# Patient Record
Sex: MALE | Race: AMERICAN INDIAN OR ALASKA NATIVE | NOT HISPANIC OR LATINO | Employment: STUDENT | URBAN - METROPOLITAN AREA
[De-identification: names, ages, dates, MRNs, and addresses within clinical notes are randomized per-mention and may not be internally consistent; named-entity substitution may affect disease eponyms.]

---
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
## 2025-01-02 ENCOUNTER — HOSPITAL ENCOUNTER (EMERGENCY)
Facility: HOSPITAL | Age: 20
Discharge: HOME | End: 2025-01-02
Attending: EMERGENCY MEDICINE

## 2025-01-02 VITALS
HEART RATE: 70 BPM | OXYGEN SATURATION: 100 % | DIASTOLIC BLOOD PRESSURE: 80 MMHG | RESPIRATION RATE: 18 BRPM | SYSTOLIC BLOOD PRESSURE: 120 MMHG | TEMPERATURE: 99 F | BODY MASS INDEX: 37.79 KG/M2 | WEIGHT: 279 LBS | HEIGHT: 72 IN

## 2025-01-02 DIAGNOSIS — R68.89 FLU-LIKE SYMPTOMS: ICD-10-CM

## 2025-01-02 DIAGNOSIS — J06.9 VIRAL URI WITH COUGH: Primary | ICD-10-CM

## 2025-01-02 LAB — POC RAPID STREP A: NEGATIVE

## 2025-01-02 PROCEDURE — 87880 STREP A ASSAY W/OPTIC: CPT | Mod: ER

## 2025-01-02 PROCEDURE — 99284 EMERGENCY DEPT VISIT MOD MDM: CPT | Mod: ER

## 2025-01-02 PROCEDURE — 25000003 PHARM REV CODE 250: Mod: ER | Performed by: PHYSICIAN ASSISTANT

## 2025-01-02 RX ORDER — GUAIFENESIN 100 MG/5ML
400 SOLUTION ORAL EVERY 4 HOURS PRN
Qty: 180 ML | Refills: 0 | Status: SHIPPED | OUTPATIENT
Start: 2025-01-02 | End: 2025-01-12

## 2025-01-02 RX ORDER — BENZONATATE 100 MG/1
100 CAPSULE ORAL 3 TIMES DAILY PRN
Qty: 20 CAPSULE | Refills: 0 | Status: SHIPPED | OUTPATIENT
Start: 2025-01-02 | End: 2025-01-09

## 2025-01-02 RX ORDER — ACETAMINOPHEN 500 MG
500 TABLET ORAL EVERY 4 HOURS PRN
Qty: 20 TABLET | Refills: 0 | Status: SHIPPED | OUTPATIENT
Start: 2025-01-02 | End: 2025-01-07

## 2025-01-02 RX ORDER — CETIRIZINE HYDROCHLORIDE 10 MG/1
10 TABLET ORAL DAILY
Qty: 14 TABLET | Refills: 0 | Status: SHIPPED | OUTPATIENT
Start: 2025-01-02 | End: 2025-01-16

## 2025-01-02 RX ORDER — ACETAMINOPHEN 500 MG
500 TABLET ORAL
Status: COMPLETED | OUTPATIENT
Start: 2025-01-02 | End: 2025-01-02

## 2025-01-02 RX ORDER — BENZONATATE 100 MG/1
100 CAPSULE ORAL
Status: COMPLETED | OUTPATIENT
Start: 2025-01-02 | End: 2025-01-02

## 2025-01-02 RX ORDER — ONDANSETRON 4 MG/1
4 TABLET, ORALLY DISINTEGRATING ORAL EVERY 6 HOURS PRN
Qty: 15 TABLET | Refills: 0 | Status: SHIPPED | OUTPATIENT
Start: 2025-01-02 | End: 2025-01-07

## 2025-01-02 RX ORDER — FLUTICASONE PROPIONATE 50 MCG
1 SPRAY, SUSPENSION (ML) NASAL 2 TIMES DAILY PRN
Qty: 15 G | Refills: 0 | Status: SHIPPED | OUTPATIENT
Start: 2025-01-02 | End: 2025-02-01

## 2025-01-02 RX ORDER — IBUPROFEN 600 MG/1
600 TABLET ORAL EVERY 6 HOURS PRN
Qty: 20 TABLET | Refills: 0 | Status: SHIPPED | OUTPATIENT
Start: 2025-01-02 | End: 2025-01-07

## 2025-01-02 RX ADMIN — ACETAMINOPHEN 500 MG: 500 TABLET, FILM COATED ORAL at 03:01

## 2025-01-02 RX ADMIN — BENZONATATE 100 MG: 100 CAPSULE ORAL at 03:01

## 2025-01-02 NOTE — ED NOTES
Fever and cough x 2 days. Sore throat while sitting in waiting room.     Pt is alert, age appropriate and in no acute distress. Respirations are even and unlabored. Bilateral breath sounds are clear throughout chest. abd is soft, not tender and not distended. pt denies change in feeding, bowel or bladder habits. Skin is warm and color is appropriate for ethnicity.  No edema noted to bilateral lower extremities. Pt moves all extremities well. Conjunctivae normal. Pt is dressed appropriately and well groomed.

## 2025-01-02 NOTE — Clinical Note
"Ayden Torres" Enmanuel was seen and treated in our emergency department on 1/2/2025.  He may return to work on 01/08/2025.       If you have any questions or concerns, please don't hesitate to call.      Laura Diaz PA-C"

## 2025-01-02 NOTE — DISCHARGE INSTRUCTIONS

## 2025-01-02 NOTE — ED PROVIDER NOTES
Encounter Date: 1/2/2025    SCRIBE #1 NOTE: I, Eddie Bell, am scribing for, and in the presence of,  Laura Diaz PA-C. I have scribed the following portions of the note - Other sections scribed: hpi,ros,pe.       History     Chief Complaint   Patient presents with    URI     Fever, abdominal pain and cough starting yesterday      CC: URI symptoms    HPI: 20 yo M, with no pertinent PMHx, presents to ED with complaint of URI symptoms onset yesterday. Patient reports productive cough, nasal congestion, sore throat, rhinorrhea, fever, headache, generalized myalgias, decreased appetite, and one episode of vomiting.  Reports TMAX of 100.9 yesterday but reports in triage TMAX was 98.6. Attempted treatment for symptoms with tylenol and Theraflu without relief. Denies any known sick contact. Denies any medical problems. No other aggravating/alleviating factors. Denies diarrhea or other associated symptoms. NKDA.    The history is provided by the patient. No  was used.     Review of patient's allergies indicates:  No Known Allergies  Past Medical History:   Diagnosis Date    Scoliosis      Past Surgical History:   Procedure Laterality Date    TONSILLECTOMY, ADENOIDECTOMY Bilateral 7/19/2024    Procedure: TONSILLECTOMY AND ADENOIDECTOMY;  Surgeon: Dandy Saavedra MD;  Location: [facility] OR [address] [place] [address];  Service: ENT;  Laterality: Bilateral;     No family history on file.  Social History     Tobacco Use    Smoking status: Never   Substance Use Topics    Alcohol use: No    Drug use: No     Review of Systems   Constitutional:  Positive for appetite change (decreased) and fever. Negative for chills.   HENT:  Positive for congestion (nasal), rhinorrhea and sore throat. Negative for ear pain.    Eyes:  Negative for redness.   Respiratory:  Positive for cough (productive). Negative for shortness of breath and stridor.    Cardiovascular:  Negative for chest pain.   Gastrointestinal:  Positive for  vomiting. Negative for abdominal pain, constipation, diarrhea and nausea.   Genitourinary:  Negative for dysuria, frequency, hematuria and urgency.   Musculoskeletal:  Positive for myalgias (generalized). Negative for back pain and neck pain.   Skin:  Negative for rash.   Neurological:  Positive for headaches. Negative for dizziness, speech difficulty, weakness, light-headedness and numbness.   Hematological:  Does not bruise/bleed easily.   Psychiatric/Behavioral:  Negative for confusion.        Physical Exam     Initial Vitals [01/02/25 1229]   BP Pulse Resp Temp SpO2   (!) 120/91 75 20 98.6 °F (37 °C) 98 %      MAP       --         Physical Exam    Nursing note and vitals reviewed.  Constitutional: He appears well-developed and well-nourished. No distress.   HENT:   Head: Normocephalic.   Right Ear: Hearing, tympanic membrane, external ear and ear canal normal.   Left Ear: Hearing, tympanic membrane, external ear and ear canal normal.   Nose: Rhinorrhea (clear) present. Mouth/Throat: Posterior oropharyngeal erythema present. No oropharyngeal exudate or posterior oropharyngeal edema.   There is nasal congestion. No peritonsillar swelling or uvular deviation. Tolerating secretions.   Eyes: Conjunctivae are normal.   Neck: Neck supple.   Cardiovascular:  Normal rate and regular rhythm.     Exam reveals no gallop and no friction rub.       No murmur heard.  Pulmonary/Chest: Breath sounds normal. No respiratory distress. He has no wheezes. He has no rhonchi. He has no rales.   Abdominal: Abdomen is soft. Bowel sounds are normal. He exhibits no distension. There is no abdominal tenderness. There is no rebound and no guarding.   Musculoskeletal:      Cervical back: Neck supple.     Lymphadenopathy:     He has no cervical adenopathy.   Neurological: He is alert.   Skin: Skin is warm and dry. No rash noted.   Psychiatric: He has a normal mood and affect.         ED Course   Procedures  Labs Reviewed   POCT STREP A  MOLECULAR          Imaging Results    None          Medications   benzonatate capsule 100 mg (100 mg Oral Given 1/2/25 1545)   acetaminophen tablet 500 mg (500 mg Oral Given 1/2/25 1545)     Medical Decision Making  19 year old male no medical history presenting for URI symptoms as above that began yesterday.  Patient is afebrile nontoxic appearing in no distress.  Exam above.  Lungs clear auscultation doubt pneumonia.  No evidence of peritonsillar retropharyngeal abscess or airway compromise on exam.  He is strep negative.  Considered but low suspicion for bacterial etiology at this time.  Unfortunately there is a shortage of COVID and flu swabs at this facility at this time.  Unable to test the patient for viral etiology however this would not significantly .  Patient was offered tamiflu in shared decision making to the pt and he declined and would like only medications for symptomatic treatment.  Who was given Tessalon Tylenol in the ED.  Discharge with medications for symptomatic treatment.  Follow up with primary care in 2 days return ER for worsening or as needed.    Amount and/or Complexity of Data Reviewed  Labs: ordered. Decision-making details documented in ED Course.    Risk  OTC drugs.  Prescription drug management.            Scribe Attestation:   Scribe #1: I performed the above scribed service and the documentation accurately describes the services I performed. I attest to the accuracy of the note.                         I, Laura Diaz PA-C , personally performed the services described in this documentation. All medical record entries made by the scribe were at my direction and in my presence. I have reviewed the chart and agree that the record reflects my personal performance and is accurate and complete.      DISCLAIMER: This note was prepared with [employer] voice recognition transcription software. Garbled syntax, mangled pronouns, and other bizarre constructions may be  attributed to that software system.       Clinical Impression:  Final diagnoses:  [J06.9] Viral URI with cough (Primary)  [R68.89] Flu-like symptoms                 Laura Diaz PA-C  01/02/25 1946

## (undated) DEVICE — BLADE RED 40 ADENOID

## (undated) DEVICE — CATH ALL PUR URTHL RR 10FR

## (undated) DEVICE — PENCIL ROCKER SWITCH 10FT CORD

## (undated) DEVICE — SPONGE TONSIL MEDIUM

## (undated) DEVICE — PACK TONSIL CUSTOM

## (undated) DEVICE — SUCTION COAGULATOR 10FR 6IN

## (undated) DEVICE — SYR BULB EAR/ULCER STER 3OZ

## (undated) DEVICE — KIT ANTIFOG W/SPONG & FLUID

## (undated) DEVICE — ELECTRODE REM PLYHSV RETURN 9

## (undated) DEVICE — CATH SUCTION 14FR CONTROL

## (undated) DEVICE — GOWN POLY REINF BRTH SLV XL